# Patient Record
Sex: FEMALE | URBAN - METROPOLITAN AREA
[De-identification: names, ages, dates, MRNs, and addresses within clinical notes are randomized per-mention and may not be internally consistent; named-entity substitution may affect disease eponyms.]

---

## 2024-04-15 ENCOUNTER — HOSPITAL ENCOUNTER (OUTPATIENT)
Age: 54
Discharge: HOME | End: 2024-04-15

## 2024-04-15 LAB — RUBV IGG SERPL QL IA: 231 IU/ML

## 2024-04-15 PROCEDURE — 86762 RUBELLA ANTIBODY: CPT

## 2024-04-15 PROCEDURE — 86481 TB AG RESPONSE T-CELL SUSP: CPT

## 2024-04-15 PROCEDURE — 86765 RUBEOLA ANTIBODY: CPT

## 2024-04-15 PROCEDURE — 36415 COLL VENOUS BLD VENIPUNCTURE: CPT

## 2024-04-15 PROCEDURE — 86735 MUMPS ANTIBODY: CPT

## 2024-04-15 PROCEDURE — 86787 VARICELLA-ZOSTER ANTIBODY: CPT

## 2024-04-17 LAB
MEV IGG SER-ACNC: 3.85
MUV IGG SER IA-ACNC: 0.57
VZV IGG SER QL IA: 4.85

## 2024-04-18 LAB — T-SPOT TB TEST: NORMAL

## 2024-06-24 ENCOUNTER — TELEPHONE (OUTPATIENT)
Dept: ORTHOPEDIC SURGERY | Age: 54
End: 2024-06-24

## 2024-06-24 NOTE — TELEPHONE ENCOUNTER
Patient was called and scheduled with Dr English on 7/3/24. She understood she needs to bring all imaging and surgical report with her.

## 2024-06-24 NOTE — TELEPHONE ENCOUNTER
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 Has been seen by Dr. Stokes in [place], [place]. Has had 2 prior surg with the most recent on being 01/24/24 to repair her rotator cuff and her bicep tendon. The other was about 5 yrs ago.    States just had a ultrasound done recent and it showed that she has a tear in her bicep and still has issues with her rotator cuff.    Will faxing over the op notes and results from ultrasound for Dr. English to review.     Please call pt to asst with appt if Dr. English is ok with taking over care @ [phone]

## 2024-07-03 ENCOUNTER — HOSPITAL ENCOUNTER (OUTPATIENT)
Age: 54
Setting detail: SPECIMEN
Discharge: HOME | End: 2024-07-03

## 2024-07-03 ENCOUNTER — OFFICE VISIT (OUTPATIENT)
Dept: ORTHOPEDIC SURGERY | Age: 54
End: 2024-07-03
Payer: COMMERCIAL

## 2024-07-03 VITALS — WEIGHT: 201 LBS | BODY MASS INDEX: 27.22 KG/M2 | RESPIRATION RATE: 15 BRPM | HEIGHT: 72 IN

## 2024-07-03 DIAGNOSIS — M25.511 RIGHT SHOULDER PAIN, UNSPECIFIED CHRONICITY: Primary | ICD-10-CM

## 2024-07-03 DIAGNOSIS — M25.511 RIGHT SHOULDER PAIN, UNSPECIFIED CHRONICITY: ICD-10-CM

## 2024-07-03 LAB
CRP SERPL HS-MCNC: <3 MG/L (ref 0–5)
ERYTHROCYTE [DISTWIDTH] IN BLOOD BY AUTOMATED COUNT: 14.3 % (ref 11.8–14.4)
ERYTHROCYTE [SEDIMENTATION RATE] IN BLOOD BY PHOTOMETRIC METHOD: 8 MM/HR (ref 0–30)
HCT VFR BLD AUTO: 45.8 % (ref 36.3–47.1)
HGB BLD-MCNC: 14.4 G/DL (ref 11.9–15.1)
MCH RBC QN AUTO: 28.9 PG (ref 25.2–33.5)
MCHC RBC AUTO-ENTMCNC: 31.4 G/DL (ref 28.4–34.8)
MCV RBC AUTO: 91.8 FL (ref 82.6–102.9)
NRBC BLD-RTO: 0 PER 100 WBC
PLATELET # BLD AUTO: 304 K/UL (ref 138–453)
PMV BLD AUTO: 9 FL (ref 8.1–13.5)
RBC # BLD AUTO: 4.99 M/UL (ref 3.95–5.11)
WBC OTHER # BLD: 7.5 K/UL (ref 3.5–11.3)

## 2024-07-03 PROCEDURE — 99204 OFFICE O/P NEW MOD 45 MIN: CPT | Performed by: ORTHOPAEDIC SURGERY

## 2024-07-03 SDOH — HEALTH STABILITY: PHYSICAL HEALTH: ON AVERAGE, HOW MANY DAYS PER WEEK DO YOU ENGAGE IN MODERATE TO STRENUOUS EXERCISE (LIKE A BRISK WALK)?: 0 DAYS

## 2024-07-03 NOTE — PROGRESS NOTES
abd/IR     Crepitation  No    Crepitation No  Dyskenesia  No    Dyskenesia No      Muscle strength:    Right       Left    Deltoid   5    Deltoid   5  Supraspinatus  5p    Supraspinatus  5  ER   5    ER   5  IR   5    IR   5    Special tests    Right   Rotator Cuff    Left    y   Painful arc    n   n   Pain with ER    n    y   Neer's     n    y   Hawkin's    n    n   Drop Arm    n  n   Lift off/Belly Press   n  n   ER Lag    n          AC Joint  n   AC tenderness   n  n   Cross-chest adduction  n       Labrum/biceps    Y (equivocal)  Hamilton's    n   n   Biceps sheer    n      y   Speed's/Yergason's   n    y   Tenderness Biceps Groove  n    y   Jefry's    n         Instability  n   Ant Apprehension   n    n   Post Apprehension   n    n   Ant Load shift    n    n   Post Load shift   n   n   Sulcus     n  n   Generalized Laxity   n  n   Relocation test   n  n   Crank test     n  n   Earl-superior escape  n       Imaging:  Xrays: 4 views of the right shoulder obtained on 7/3/2024 were independently reviewed  Indications: Right shoulder pain  Findings: Normal glenohumeral and acromioclavicular joint spaces.  Flattened acromion consistent with previous subacromial decompression.  Metal button noted in the proximal humerus consistent with previous biceps tenodesis.  No obvious fracture, dislocation or subluxation.  Impression: Right shoulder radiographs with postsurgical changes as outlined above.      Report of a right shoulder ultrasound performed couple weeks ago was made available for review indicating an intact rotator cuff with complex fluid collection in the subacromial and subdeltoid spaces.  Possible read tear of biceps tenodesis as the tendon is not adequately visualized at the tenodesis site.    Impression/Plan:     Timur Causey is a 54 y.o. old female with right shoulder pain.  History as outlined above.  I had a discussion with the patient about possible etiologies.  It is unclear as to the cause or

## 2025-02-27 NOTE — PROGRESS NOTES
[facility]   [address] [place] [address]  Dept: [phone]   Patient:  Timur Causey   :  1970   Visit Date:  3/5/2025     VISIT - ANNUAL WELL EXAM  CC: This is an annual well patient visit.     Chaperone present for entire visit and pertinent physical exam: None Required    HPI:  Pt feels well, no concerns.  LMP , no vaginal bleeding/HF/NS. Prior tubal ligation. Mutually monogamous relationship.     Overall state of well-being reviewed. Fall & depression assessments confirmed. Dietary & nutritional habits reviewed & fist-size portions of lean protein, fruits & vegetables, & carbs with each meal recommended. Low glycemic indexed foods recommended for snacks. Water intake discussed with caffeine & alcohol moderation discussed. Weight bearing exercise routines of at least 30 minutes 3 times a week discussed for healthy weight maintenance.  Weight reduction through exercise, a healthy diet, & evidenced based programs like Weight Watchers & NOOM discussed as necessary. Breast symptoms, abnormal abdominal bloating, bowel and bladder function, smoking history, HRT history, sexual activity & partner/s, dyspareunia, and immunizations reviewed.    Topics of Prolapse, Pain, Pressure reviewed including type, period of onset, level of severity, quality and quantity, associations, trends, exacerbators, alleviators, bleeding, prolapse to reduce, splinting, and prior treatment / surgery.    Topics of Urinary Leakage reviewed including type, period of onset, level of severity, quality and quantity, associations, trends, exacerbators, alleviators, urgency, frequency, nocturia ,urge incontinence / stress incontinence triggers, hesitancy, obstruction with need to catheterize, frequent UTI\"s >3 in a year diagnosed by culture, hematuria (gross or microscopic), urinary stones, and prior treatment

## 2025-02-28 ENCOUNTER — TRANSCRIBE ORDERS (OUTPATIENT)
Dept: ADMINISTRATIVE | Age: 55
End: 2025-02-28

## 2025-02-28 DIAGNOSIS — S43.431A SUPERIOR GLENOID LABRUM LESION OF RIGHT SHOULDER, INITIAL ENCOUNTER: ICD-10-CM

## 2025-02-28 DIAGNOSIS — M19.011 ARTHRITIS OF RIGHT ACROMIOCLAVICULAR JOINT: ICD-10-CM

## 2025-02-28 DIAGNOSIS — S46.011D TRAUMATIC INCOMPLETE TEAR OF RIGHT ROTATOR CUFF, SUBSEQUENT ENCOUNTER: ICD-10-CM

## 2025-02-28 DIAGNOSIS — M75.51 SUBACROMIAL BURSITIS OF RIGHT SHOULDER JOINT: Primary | ICD-10-CM

## 2025-03-03 RX ORDER — ESZOPICLONE 3 MG/1
10 TABLET, FILM COATED ORAL
COMMUNITY
Start: 2024-06-06

## 2025-03-03 RX ORDER — GABAPENTIN 300 MG/1
CAPSULE ORAL
COMMUNITY
Start: 2024-06-06

## 2025-03-05 ENCOUNTER — HOSPITAL ENCOUNTER (OUTPATIENT)
Age: 55
Setting detail: SPECIMEN
Discharge: HOME | End: 2025-03-05

## 2025-03-05 ENCOUNTER — OFFICE VISIT (OUTPATIENT)
Age: 55
End: 2025-03-05
Payer: COMMERCIAL

## 2025-03-05 VITALS
DIASTOLIC BLOOD PRESSURE: 104 MMHG | OXYGEN SATURATION: 98 % | HEART RATE: 105 BPM | BODY MASS INDEX: 26.51 KG/M2 | SYSTOLIC BLOOD PRESSURE: 154 MMHG | WEIGHT: 200 LBS

## 2025-03-05 DIAGNOSIS — Z13.820 SCREENING FOR OSTEOPOROSIS: ICD-10-CM

## 2025-03-05 DIAGNOSIS — Z11.51 SCREENING FOR HPV (HUMAN PAPILLOMAVIRUS): ICD-10-CM

## 2025-03-05 DIAGNOSIS — Z01.419 ENCOUNTER FOR ANNUAL ROUTINE GYNECOLOGICAL EXAMINATION: Primary | ICD-10-CM

## 2025-03-05 DIAGNOSIS — Z12.31 ENCOUNTER FOR SCREENING MAMMOGRAM FOR MALIGNANT NEOPLASM OF BREAST: ICD-10-CM

## 2025-03-05 PROCEDURE — 99396 PREV VISIT EST AGE 40-64: CPT | Performed by: NURSE PRACTITIONER

## 2025-03-05 RX ORDER — RIMEGEPANT SULFATE 75 MG/75MG
TABLET, ORALLY DISINTEGRATING ORAL
COMMUNITY
Start: 2024-09-30

## 2025-03-05 RX ORDER — OLMESARTAN MEDOXOMIL AND HYDROCHLOROTHIAZIDE 40/12.5 40; 12.5 MG/1; MG/1
1 TABLET ORAL DAILY
COMMUNITY
Start: 2024-12-13

## 2025-03-05 RX ORDER — CYCLOBENZAPRINE HCL 10 MG
TABLET ORAL
COMMUNITY
Start: 2025-01-15

## 2025-03-07 LAB
HPV I/H RISK 4 DNA CVX QL NAA+PROBE: NOT DETECTED
HPV SAMPLE: NORMAL
HPV, INTERPRETATION: NORMAL
HPV16 DNA CVX QL NAA+PROBE: NOT DETECTED
HPV18 DNA CVX QL NAA+PROBE: NOT DETECTED
SPECIMEN DESCRIPTION: NORMAL

## 2025-03-11 ENCOUNTER — HOSPITAL ENCOUNTER (OUTPATIENT)
Dept: ULTRASOUND IMAGING | Age: 55
Discharge: HOME | End: 2025-03-13
Payer: COMMERCIAL

## 2025-03-11 DIAGNOSIS — S46.011D TRAUMATIC INCOMPLETE TEAR OF RIGHT ROTATOR CUFF, SUBSEQUENT ENCOUNTER: ICD-10-CM

## 2025-03-11 DIAGNOSIS — M19.011 ARTHRITIS OF RIGHT ACROMIOCLAVICULAR JOINT: ICD-10-CM

## 2025-03-11 DIAGNOSIS — M75.51 SUBACROMIAL BURSITIS OF RIGHT SHOULDER JOINT: ICD-10-CM

## 2025-03-11 DIAGNOSIS — S43.431A SUPERIOR GLENOID LABRUM LESION OF RIGHT SHOULDER, INITIAL ENCOUNTER: ICD-10-CM

## 2025-03-11 PROCEDURE — 76881 US COMPL JOINT R-T W/IMG: CPT

## 2025-03-12 ENCOUNTER — RESULTS FOLLOW-UP (OUTPATIENT)
Dept: LAB | Age: 55
End: 2025-03-12

## 2025-03-12 LAB — CYTOLOGY REPORT: NORMAL

## 2025-03-12 RX ORDER — METRONIDAZOLE 7.5 MG/G
1 GEL VAGINAL DAILY
Qty: 70 G | Refills: 0 | Status: SHIPPED | OUTPATIENT
Start: 2025-03-12 | End: 2025-03-17

## 2025-03-12 NOTE — RESULT ENCOUNTER NOTE
Timur,  Your pap was normal with an overgrowth of bacteria. I sent a prescription for vaginal Metrogel, let me know if you have any questions.

## 2025-03-14 DIAGNOSIS — Z13.820 SCREENING FOR OSTEOPOROSIS: ICD-10-CM

## 2025-03-14 PROCEDURE — 77080 DXA BONE DENSITY AXIAL: CPT | Performed by: NURSE PRACTITIONER

## 2025-03-17 ENCOUNTER — RESULTS FOLLOW-UP (OUTPATIENT)
Age: 55
End: 2025-03-17

## 2025-03-17 NOTE — RESULT ENCOUNTER NOTE
Timur,  Your dexa is normal. 1200 mg calcium/1000IU vit d daily helps maintain bone strength. Let me know if you have any questions.

## 2025-03-18 SDOH — HEALTH STABILITY: PHYSICAL HEALTH: ON AVERAGE, HOW MANY DAYS PER WEEK DO YOU ENGAGE IN MODERATE TO STRENUOUS EXERCISE (LIKE A BRISK WALK)?: 0 DAYS

## 2025-03-18 SDOH — HEALTH STABILITY: PHYSICAL HEALTH: ON AVERAGE, HOW MANY MINUTES DO YOU ENGAGE IN EXERCISE AT THIS LEVEL?: 0 MIN

## 2025-03-19 ENCOUNTER — OFFICE VISIT (OUTPATIENT)
Age: 55
End: 2025-03-19
Payer: COMMERCIAL

## 2025-03-19 VITALS — WEIGHT: 200 LBS | HEIGHT: 72 IN | BODY MASS INDEX: 27.09 KG/M2

## 2025-03-19 DIAGNOSIS — M75.01 ADHESIVE CAPSULITIS OF RIGHT SHOULDER: Primary | ICD-10-CM

## 2025-03-19 PROCEDURE — 99204 OFFICE O/P NEW MOD 45 MIN: CPT | Performed by: ORTHOPAEDIC SURGERY

## 2025-03-19 NOTE — PROGRESS NOTES
3. Open distal clavicle excision, right shoulder.    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  Of note, patient endorses prior history of frozen shoulder on her left side.  Patient is unable to receive MRI due to lower back stimulator implant.    Physical Exam:    RUE  No ecchymoses, abrasion, deformity, or lacerations. Skin intact. Tender to palpation about global shoulder, worst anterior proximal humerus, along biceps proximally and AC joint.  She is tender over the anterior shoulder capsule.  Nontender to palpate midline repair spinal cervical spine.  Compartments soft. Ulnar/Median/AIN/PIN/Radial motor intact. Axillary/Median/Radial/Ulnar nerve SILT.  Hand warm and well-perfused.    AROM shoulder:  Flexion Abduction to 160 with pain and difficulty.  Limited external and internal rotation compared to contralateral side.    Equivocal supraspinatus empty can test.  Positive impingement.     Nursing note and vitals reviewed.     Labs and Imaging:     XR taken today:  No results found.     Patient underwent ultrasound of her right shoulder on 3/14/2025, radiology interpretation:  1.  No evidence of rotator cuff tear or significant tendinopathy.  Please note that MRI is more sensitive.     2.  Irregular appearance of the acromioclavicular joint consistent with the history of previous subacromial decompression.  No significant subacromial  bursal fluid is visualized.     No orders of the defined types were placed in this encounter.      Assessment and Plan:  1. Adhesive capsulitis of right shoulder      This is a 54 y.o. RHD female with right frozen shoulder.    Today we discussed the patient's current clinical status. We discussed the etiology of her shoulder pain is likely a frozen shoulder, and explained the long clinical course it can take. We explained that having undergone 2 shoulder surgeries, one within a year's timeframe, that we should allow more time for her shoulder to